# Patient Record
Sex: FEMALE | Race: WHITE | NOT HISPANIC OR LATINO | URBAN - METROPOLITAN AREA
[De-identification: names, ages, dates, MRNs, and addresses within clinical notes are randomized per-mention and may not be internally consistent; named-entity substitution may affect disease eponyms.]

---

## 2017-12-12 ENCOUNTER — APPOINTMENT (RX ONLY)
Dept: URBAN - METROPOLITAN AREA CLINIC 349 | Facility: CLINIC | Age: 37
Setting detail: DERMATOLOGY
End: 2017-12-12

## 2017-12-12 DIAGNOSIS — I78.8 OTHER DISEASES OF CAPILLARIES: ICD-10-CM

## 2017-12-12 DIAGNOSIS — D18.0 HEMANGIOMA: ICD-10-CM

## 2017-12-12 DIAGNOSIS — L81.4 OTHER MELANIN HYPERPIGMENTATION: ICD-10-CM

## 2017-12-12 PROBLEM — D18.01 HEMANGIOMA OF SKIN AND SUBCUTANEOUS TISSUE: Status: ACTIVE | Noted: 2017-12-12

## 2017-12-12 PROBLEM — F32.9 MAJOR DEPRESSIVE DISORDER, SINGLE EPISODE, UNSPECIFIED: Status: ACTIVE | Noted: 2017-12-12

## 2017-12-12 PROCEDURE — 99202 OFFICE O/P NEW SF 15 MIN: CPT | Mod: 25

## 2017-12-12 PROCEDURE — ? BENIGN DESTRUCTION

## 2017-12-12 PROCEDURE — ? COUNSELING

## 2017-12-12 PROCEDURE — 17110 DESTRUCTION B9 LES UP TO 14: CPT

## 2017-12-12 ASSESSMENT — LOCATION SIMPLE DESCRIPTION DERM
LOCATION SIMPLE: LEFT CHEEK
LOCATION SIMPLE: RIGHT CHEEK
LOCATION SIMPLE: RIGHT FOREHEAD
LOCATION SIMPLE: LEFT FOREHEAD

## 2017-12-12 ASSESSMENT — LOCATION DETAILED DESCRIPTION DERM
LOCATION DETAILED: RIGHT FOREHEAD
LOCATION DETAILED: RIGHT MEDIAL MALAR CHEEK
LOCATION DETAILED: LEFT MEDIAL MALAR CHEEK
LOCATION DETAILED: LEFT LATERAL FOREHEAD
LOCATION DETAILED: RIGHT SUPERIOR MEDIAL MALAR CHEEK
LOCATION DETAILED: LEFT MEDIAL FOREHEAD

## 2017-12-12 ASSESSMENT — LOCATION ZONE DERM: LOCATION ZONE: FACE

## 2017-12-12 NOTE — HPI: SKIN LESION
How Severe Is Your Skin Lesion?: moderate
Has Your Skin Lesion Been Treated?: not been treated
Is This A New Presentation, Or A Follow-Up?: Skin Lesion
Which Family Member (Optional)?: Father and a Grandfather

## 2017-12-12 NOTE — PROCEDURE: BENIGN DESTRUCTION
Bill Insurance (You Assume Risk Of Denial Or Audit By Selecting Yes): Yes
Medical Necessity Clause: This procedure was medically necessary because the lesions that were treated were: inflamed from day to day cleansing
Medical Necessity Information: It is in your best interest to select a reason for this procedure from the list below. All of these items fulfill various CMS LCD requirements except the new and changing color options.
Anesthesia Volume In Cc: 0
Add 52 Modifier (Optional): no
Detail Level: Detailed
Post-Care Instructions: I reviewed with the patient in detail post-care instructions. Patient is to wear sunprotection, and avoid picking at any of the treated lesions. Pt may apply Vaseline to crusted or scabbing areas. After the procedure, the patient was observed for 5-10 minutes and was oriented to,person, place and time and denied feeling dizzy, queasy and stated that they were not going to faint
Consent: The patient's consent was obtained including but not limited to risks of crusting, scabbing, blistering, scarring, darker or lighter pigmentary change, recurrence, incomplete removal and infection.

## 2022-05-10 ENCOUNTER — OFFICE VISIT (OUTPATIENT)
Dept: URBAN - NONMETROPOLITAN AREA CLINIC 2 | Facility: CLINIC | Age: 42
End: 2022-05-10
Payer: COMMERCIAL

## 2022-05-10 DIAGNOSIS — R93.5 ABNORMAL US (ULTRASOUND) OF ABDOMEN: ICD-10-CM

## 2022-05-10 DIAGNOSIS — R11.0 NAUSEA: ICD-10-CM

## 2022-05-10 DIAGNOSIS — R10.32 LLQ ABDOMINAL PAIN: ICD-10-CM

## 2022-05-10 DIAGNOSIS — R19.5 LOOSE STOOLS: ICD-10-CM

## 2022-05-10 PROCEDURE — 99204 OFFICE O/P NEW MOD 45 MIN: CPT | Performed by: NURSE PRACTITIONER

## 2022-05-10 RX ORDER — OMEPRAZOLE 40 MG/1
CAPSULE, DELAYED RELEASE ORAL
Qty: 30 | Status: ACTIVE | COMMUNITY

## 2022-05-10 RX ORDER — NORGESTIMATE AND ETHINYL ESTRADIOL 0.25-0.035
KIT ORAL
Qty: 112 | Status: ACTIVE | COMMUNITY

## 2022-05-10 RX ORDER — LAMOTRIGINE 50 MG/1
TABLET, ORALLY DISINTEGRATING ORAL
Qty: 90 | Status: ACTIVE | COMMUNITY

## 2022-05-10 RX ORDER — FLUOXETINE 40 MG/1
CAPSULE ORAL
Qty: 90 | Status: ACTIVE | COMMUNITY

## 2022-05-10 RX ORDER — GALCANEZUMAB 120 MG/ML
INJECT 1 PEN SUBCUTANEOUSLY ONCE A MONTH INJECTION, SOLUTION SUBCUTANEOUS
Qty: 1 | Refills: 1 | Status: ACTIVE | COMMUNITY

## 2022-05-10 RX ORDER — DICYCLOMINE HYDROCHLORIDE 10 MG/1
1 CAPSULES CAPSULE ORAL THREE TIMES A DAY
Qty: 90 CAPSULE | Refills: 3 | OUTPATIENT
Start: 2022-05-10 | End: 2022-09-07

## 2022-05-10 RX ORDER — SUMATRIPTAN SUCCINATE 100 MG/1
TAKE 1 TABLET BY MOUTH EVERY DAY AS NEEDED TABLET ORAL
Qty: 18 | Refills: 0 | Status: ACTIVE | COMMUNITY

## 2022-05-10 RX ORDER — VORTIOXETINE 10 MG/1
TABLET, FILM COATED ORAL
Qty: 30 | Status: ACTIVE | COMMUNITY

## 2022-05-10 NOTE — HPI-TODAY'S VISIT:
Ms. Raman is a 42-year-old female who presents with change in bowel habit and abdominal pain.  She states that earlier this year, she started experiencing loose stools.  Sometimes it would be once to few times a day.  No blood or nocturnal complaints.  She was not having any additional complaints until recently.  Recently, she started having some postprandial nausea as well as some left lower quad pain.  Left lower quad pain was somewhat dull, lasted about 3 days, and resolved.  She is no longer having abdominal pain.  PCP did order an ultrasound that was concerning for a liver hemangioma.  A CT scan has been ordered by her PCP, but she has not completed this yet.  No additional complaints. Sb

## 2022-05-13 ENCOUNTER — WEB ENCOUNTER (OUTPATIENT)
Dept: URBAN - METROPOLITAN AREA CLINIC 92 | Facility: CLINIC | Age: 42
End: 2022-05-13

## 2022-05-13 LAB
DEAMIDATED GLIADIN ABS, IGA: 3
DEAMIDATED GLIADIN ABS, IGG: 3
ENDOMYSIAL ANTIBODY IGA: NEGATIVE
H PYLORI, IGM ABS: <9
H. PYLORI, IGA ABS: <9
H. PYLORI, IGG ABS: 0.12
IMMUNOGLOBULIN A, QN, SERUM: 99
T-TRANSGLUTAMINASE (TTG) IGA: <2
T-TRANSGLUTAMINASE (TTG) IGG: <2

## 2022-05-14 ENCOUNTER — LAB OUTSIDE AN ENCOUNTER (OUTPATIENT)
Dept: URBAN - NONMETROPOLITAN AREA CLINIC 2 | Facility: CLINIC | Age: 42
End: 2022-05-14

## 2022-05-19 LAB
CALPROTECTIN, STOOL - QDX: (no result)
GASTROINTESTINAL PATHOGEN: (no result)
PANCREATICELASTASE ELISA, STOOL: (no result)

## 2022-05-20 ENCOUNTER — TELEPHONE ENCOUNTER (OUTPATIENT)
Dept: URBAN - METROPOLITAN AREA CLINIC 92 | Facility: CLINIC | Age: 42
End: 2022-05-20

## 2022-07-06 ENCOUNTER — WEB ENCOUNTER (OUTPATIENT)
Dept: URBAN - NONMETROPOLITAN AREA CLINIC 2 | Facility: CLINIC | Age: 42
End: 2022-07-06

## 2022-07-15 ENCOUNTER — OFFICE VISIT (OUTPATIENT)
Dept: URBAN - NONMETROPOLITAN AREA CLINIC 2 | Facility: CLINIC | Age: 42
End: 2022-07-15

## 2023-09-08 ENCOUNTER — P2P PATIENT RECORD (OUTPATIENT)
Age: 43
End: 2023-09-08

## 2023-09-19 ENCOUNTER — TELEPHONE ENCOUNTER (OUTPATIENT)
Dept: URBAN - NONMETROPOLITAN AREA CLINIC 2 | Facility: CLINIC | Age: 43
End: 2023-09-19

## 2023-09-22 ENCOUNTER — LAB OUTSIDE AN ENCOUNTER (OUTPATIENT)
Dept: URBAN - NONMETROPOLITAN AREA CLINIC 2 | Facility: CLINIC | Age: 43
End: 2023-09-22

## 2023-09-22 ENCOUNTER — OFFICE VISIT (OUTPATIENT)
Dept: URBAN - NONMETROPOLITAN AREA CLINIC 2 | Facility: CLINIC | Age: 43
End: 2023-09-22
Payer: COMMERCIAL

## 2023-09-22 VITALS
HEART RATE: 84 BPM | BODY MASS INDEX: 29.95 KG/M2 | WEIGHT: 169 LBS | HEIGHT: 63 IN | SYSTOLIC BLOOD PRESSURE: 147 MMHG | TEMPERATURE: 98.1 F | DIASTOLIC BLOOD PRESSURE: 100 MMHG

## 2023-09-22 DIAGNOSIS — R93.5 ABNORMAL US (ULTRASOUND) OF ABDOMEN: ICD-10-CM

## 2023-09-22 DIAGNOSIS — R11.0 NAUSEA: ICD-10-CM

## 2023-09-22 DIAGNOSIS — R10.32 LLQ ABDOMINAL PAIN: ICD-10-CM

## 2023-09-22 DIAGNOSIS — K58.0 IRRITABLE BOWEL SYNDROME WITH DIARRHEA: ICD-10-CM

## 2023-09-22 DIAGNOSIS — R19.5 LOOSE STOOLS: ICD-10-CM

## 2023-09-22 PROBLEM — 197125005: Status: ACTIVE | Noted: 2023-09-22

## 2023-09-22 PROCEDURE — 99214 OFFICE O/P EST MOD 30 MIN: CPT | Performed by: NURSE PRACTITIONER

## 2023-09-22 RX ORDER — LAMOTRIGINE 50 MG/1
TABLET, ORALLY DISINTEGRATING ORAL
Qty: 90 | Status: ACTIVE | COMMUNITY

## 2023-09-22 RX ORDER — GALCANEZUMAB 120 MG/ML
INJECT 1 PEN SUBCUTANEOUSLY ONCE A MONTH INJECTION, SOLUTION SUBCUTANEOUS
Qty: 1 | Refills: 1 | Status: ACTIVE | COMMUNITY

## 2023-09-22 RX ORDER — VORTIOXETINE 10 MG/1
TABLET, FILM COATED ORAL
Qty: 30 | Status: ACTIVE | COMMUNITY

## 2023-09-22 RX ORDER — DICYCLOMINE HYDROCHLORIDE 20 MG/1
1 TABLET TABLET ORAL THREE TIMES A DAY
Qty: 90 | Refills: 5 | OUTPATIENT
Start: 2023-09-22 | End: 2023-10-22

## 2023-09-22 RX ORDER — SUMATRIPTAN SUCCINATE 100 MG/1
TAKE 1 TABLET BY MOUTH EVERY DAY AS NEEDED TABLET ORAL
Qty: 18 | Refills: 0 | Status: ACTIVE | COMMUNITY

## 2023-09-22 RX ORDER — DIPHENOXYLATE HYDROCHLORIDE AND ATROPINE SULFATE 2.5; .025 MG/1; MG/1
1 TABLET AS NEEDED TABLET ORAL
Qty: 120 TABLET | Refills: 0 | OUTPATIENT
Start: 2023-09-22

## 2023-09-22 RX ORDER — NORGESTIMATE AND ETHINYL ESTRADIOL 0.25-0.035
KIT ORAL
Qty: 112 | Status: ACTIVE | COMMUNITY

## 2023-09-22 RX ORDER — OMEPRAZOLE 40 MG/1
CAPSULE, DELAYED RELEASE ORAL
Qty: 30 | Status: ACTIVE | COMMUNITY

## 2023-09-22 RX ORDER — FLUOXETINE 40 MG/1
CAPSULE ORAL
Qty: 90 | Status: ACTIVE | COMMUNITY

## 2023-09-22 NOTE — HPI-TODAY'S VISIT:
Autumn Raman is a 43-year-old female who presents for progressive diarrhea.  She was last seen in May with loose stools.  Blood work was normal but a fecal Jesus Pro was borderline.  A colonoscopy was recommended but Autumn elected to defer at that time.  Over the last several months she has been experiencing worsening diarrhea, now experiencing 5 or more times per day.  She saw her primary care provider who prescribed her budesonide 9 mg and cholestyramine.  She reports that with these medications she is still experiencing cramping when she goes to the bathroom but her stools have been more of a mushy pudding consistency.  She tried Imodium in the past with no improvement of symptoms.  She is now having some 2 or 3 times a day.  Sees occasional right red blood in her stool but thinks this is due to from irritation given the amount she is going to the bathroom.  Her symptoms are significantly hindering her ability to function at work on a daily basis and she would like to proceed with a colonoscopy at this time.  We discussed prescribing Lomotil for interval symptom relief.  I will also prescribe dicyclomine for the cramps she is experiencing.  Proceed with colonoscopy to rule out IBD/MC.  LG.

## 2023-09-22 NOTE — HPI-OTHER HISTORIES
5/10/22:  Ms. Raman is a 42-year-old female who presents with change in bowel habit and abdominal pain. She states that earlier this year, she started experiencing loose stools. Sometimes it would be once to few times a day. No blood or nocturnal complaints. She was not having any additional complaints until recently. Recently, she started having some postprandial nausea as well as some left lower quad pain. Left lower quad pain was somewhat dull, lasted about 3 days, and resolved. She is no longer having abdominal pain. PCP did order an ultrasound that was concerning for a liver hemangioma. A CT scan has been ordered by her PCP, but she has not completed this yet. No additional complaints. Sb

## 2023-10-23 ENCOUNTER — WEB ENCOUNTER (OUTPATIENT)
Dept: URBAN - NONMETROPOLITAN AREA CLINIC 2 | Facility: CLINIC | Age: 43
End: 2023-10-23

## 2023-11-14 ENCOUNTER — OFFICE VISIT (OUTPATIENT)
Dept: URBAN - NONMETROPOLITAN AREA SURGERY CENTER 1 | Facility: SURGERY CENTER | Age: 43
End: 2023-11-14
Payer: COMMERCIAL

## 2023-11-14 ENCOUNTER — CLAIMS CREATED FROM THE CLAIM WINDOW (OUTPATIENT)
Dept: URBAN - METROPOLITAN AREA CLINIC 4 | Facility: CLINIC | Age: 43
End: 2023-11-14
Payer: COMMERCIAL

## 2023-11-14 DIAGNOSIS — K52.832 LYMPHOCYTIC COLITIS: ICD-10-CM

## 2023-11-14 DIAGNOSIS — R19.7 DIARRHEA, UNSPECIFIED TYPE: ICD-10-CM

## 2023-11-14 DIAGNOSIS — K52.832 LYMPHOCYTIC  COLITIS: ICD-10-CM

## 2023-11-14 DIAGNOSIS — R19.7 ACUTE DIARRHEA: ICD-10-CM

## 2023-11-14 DIAGNOSIS — K62.89 OTHER SPECIFIED DISEASES OF ANUS AND RECTUM: ICD-10-CM

## 2023-11-14 DIAGNOSIS — K52.832 COLITIS, UNSPEC (558.9): ICD-10-CM

## 2023-11-14 DIAGNOSIS — K52.839 MICROSCOPIC COLITIS, UNSPECIFIED: ICD-10-CM

## 2023-11-14 PROCEDURE — 88342 IMHCHEM/IMCYTCHM 1ST ANTB: CPT | Performed by: PATHOLOGY

## 2023-11-14 PROCEDURE — 88305 TISSUE EXAM BY PATHOLOGIST: CPT | Performed by: PATHOLOGY

## 2023-11-14 PROCEDURE — 00811 ANES LWR INTST NDSC NOS: CPT | Performed by: NURSE ANESTHETIST, CERTIFIED REGISTERED

## 2023-11-14 PROCEDURE — 45380 COLONOSCOPY AND BIOPSY: CPT | Performed by: INTERNAL MEDICINE

## 2023-11-14 PROCEDURE — 88313 SPECIAL STAINS GROUP 2: CPT | Performed by: PATHOLOGY

## 2023-11-14 PROCEDURE — G8907 PT DOC NO EVENTS ON DISCHARG: HCPCS | Performed by: INTERNAL MEDICINE

## 2023-12-20 ENCOUNTER — OFFICE VISIT (OUTPATIENT)
Dept: URBAN - NONMETROPOLITAN AREA CLINIC 2 | Facility: CLINIC | Age: 43
End: 2023-12-20
Payer: COMMERCIAL

## 2023-12-20 VITALS
WEIGHT: 160 LBS | SYSTOLIC BLOOD PRESSURE: 134 MMHG | HEIGHT: 63 IN | BODY MASS INDEX: 28.35 KG/M2 | HEART RATE: 68 BPM | DIASTOLIC BLOOD PRESSURE: 84 MMHG

## 2023-12-20 DIAGNOSIS — R10.32 LLQ ABDOMINAL PAIN: ICD-10-CM

## 2023-12-20 DIAGNOSIS — R93.5 ABNORMAL US (ULTRASOUND) OF ABDOMEN: ICD-10-CM

## 2023-12-20 DIAGNOSIS — R19.5 LOOSE STOOLS: ICD-10-CM

## 2023-12-20 DIAGNOSIS — K52.832 LYMPHOCYTIC COLITIS: ICD-10-CM

## 2023-12-20 DIAGNOSIS — R11.0 NAUSEA: ICD-10-CM

## 2023-12-20 DIAGNOSIS — K58.0 IRRITABLE BOWEL SYNDROME WITH DIARRHEA: ICD-10-CM

## 2023-12-20 PROBLEM — 1187071008: Status: ACTIVE | Noted: 2023-12-20

## 2023-12-20 PROCEDURE — 99214 OFFICE O/P EST MOD 30 MIN: CPT | Performed by: NURSE PRACTITIONER

## 2023-12-20 RX ORDER — NORGESTIMATE AND ETHINYL ESTRADIOL 0.25-0.035
KIT ORAL
Qty: 112 | Status: ON HOLD | COMMUNITY

## 2023-12-20 RX ORDER — VORTIOXETINE 10 MG/1
TABLET, FILM COATED ORAL
Qty: 30 | Status: ON HOLD | COMMUNITY

## 2023-12-20 RX ORDER — DICYCLOMINE HYDROCHLORIDE 20 MG/1
TAKE 1 TABLET BY MOUTH THREE TIMES DAILY TABLET ORAL
Qty: 90 EACH | Refills: 0 | Status: ACTIVE | COMMUNITY

## 2023-12-20 RX ORDER — LAMOTRIGINE 50 MG/1
TABLET, ORALLY DISINTEGRATING ORAL
Qty: 90 | Status: ON HOLD | COMMUNITY

## 2023-12-20 RX ORDER — BUDESONIDE 9 MG/1
1 TABLET IN THE MORNING TABLET, FILM COATED, EXTENDED RELEASE ORAL ONCE A DAY
Qty: 84 TABLET | Refills: 0 | OUTPATIENT
Start: 2023-12-20 | End: 2024-03-13

## 2023-12-20 RX ORDER — DIPHENOXYLATE HYDROCHLORIDE AND ATROPINE SULFATE 2.5; .025 MG/1; MG/1
1 TABLET AS NEEDED TABLET ORAL
Qty: 120 TABLET | Refills: 0 | Status: ACTIVE | COMMUNITY
Start: 2023-09-22

## 2023-12-20 RX ORDER — DIPHENOXYLATE HYDROCHLORIDE AND ATROPINE SULFATE 2.5; .025 MG/1; MG/1
TABLET ORAL
Qty: 120 TABLET | Status: ACTIVE | COMMUNITY

## 2023-12-20 RX ORDER — FLUOXETINE 40 MG/1
CAPSULE ORAL
Qty: 90 | Status: ACTIVE | COMMUNITY

## 2023-12-20 RX ORDER — GALCANEZUMAB 120 MG/ML
INJECT 1 PEN SUBCUTANEOUSLY ONCE A MONTH INJECTION, SOLUTION SUBCUTANEOUS
Qty: 1 | Refills: 1 | Status: ACTIVE | COMMUNITY

## 2023-12-20 RX ORDER — DIPHENOXYLATE HYDROCHLORIDE AND ATROPINE SULFATE 2.5; .025 MG/1; MG/1
1 TABLET AS NEEDED TABLET ORAL
Qty: 120 TABLET | Refills: 0 | OUTPATIENT

## 2023-12-20 RX ORDER — GALCANEZUMAB 120 MG/ML
INJECTION, SOLUTION SUBCUTANEOUS
Qty: 3 MILLILITER | Status: ACTIVE | COMMUNITY

## 2023-12-20 RX ORDER — SUMATRIPTAN SUCCINATE 100 MG/1
TAKE 1 TABLET BY MOUTH EVERY DAY AS NEEDED TABLET ORAL
Qty: 18 | Refills: 0 | Status: ACTIVE | COMMUNITY

## 2023-12-20 RX ORDER — OMEPRAZOLE 40 MG/1
CAPSULE, DELAYED RELEASE ORAL
Qty: 30 | Status: ON HOLD | COMMUNITY

## 2023-12-20 RX ORDER — LIOTHYRONINE SODIUM 25 UG/1
TAKE 1 TABLET BY MOUTH EVERY DAY AS DIRECTED TABLET ORAL
Qty: 30 EACH | Refills: 1 | Status: ACTIVE | COMMUNITY

## 2023-12-20 NOTE — HPI-OTHER HISTORIES
5/10/22:  Ms. Raman is a 42-year-old female who presents with change in bowel habit and abdominal pain. She states that earlier this year, she started experiencing loose stools. Sometimes it would be once to few times a day. No blood or nocturnal complaints. She was not having any additional complaints until recently. Recently, she started having some postprandial nausea as well as some left lower quad pain. Left lower quad pain was somewhat dull, lasted about 3 days, and resolved. She is no longer having abdominal pain. PCP did order an ultrasound that was concerning for a liver hemangioma. A CT scan has been ordered by her PCP, but she has not completed this yet. No additional complaints. Sb  9/2023: Autumn Raman is a 43-year-old female who presents for progressive diarrhea. She was last seen in May with loose stools. Blood work was normal but a fecal Jesus Pro was borderline. A colonoscopy was recommended but Autumn elected to defer at that time. Over the last several months she has been experiencing worsening diarrhea, now experiencing 5 or more times per day. She saw her primary care provider who prescribed her budesonide 9 mg and cholestyramine. She reports that with these medications she is still experiencing cramping when she goes to the bathroom but her stools have been more of a mushy pudding consistency. She tried Imodium in the past with no improvement of symptoms. She is now having some 2 or 3 times a day. Sees occasional right red blood in her stool but thinks this is due to from irritation given the amount she is going to the bathroom. Her symptoms are significantly hindering her ability to function at work on a daily basis and she would like to proceed with a colonoscopy at this time. We discussed prescribing Lomotil for interval symptom relief. I will also prescribe dicyclomine for the cramps she is experiencing. Proceed with colonoscopy to rule out IBD/MC. LG.

## 2023-12-20 NOTE — HPI-TODAY'S VISIT:
11/14/2023 - colon - erosion in distal rectum, path consistent with lymphocytic colitis  12/20/2023: Ms. Autumn escobar is a 43-year-old female who presents today for follow-up of colonoscopy.  Since her last visit colonoscopy showed an erosion in the distal rectum with path consistent with lymphocytic colitis.  It was otherwise normal.  She is currently been taking Lomotil, Bentyl, and budesonide 9 mg all daily with pudding-like stools usually once a day.  She also endorses occasional cramping and takes additional Bentyl.  No blood in her stool.  No nocturnal complaints.  LG.

## 2023-12-21 ENCOUNTER — TELEPHONE ENCOUNTER (OUTPATIENT)
Dept: URBAN - NONMETROPOLITAN AREA CLINIC 13 | Facility: CLINIC | Age: 43
End: 2023-12-21

## 2024-01-12 ENCOUNTER — WEB ENCOUNTER (OUTPATIENT)
Dept: URBAN - NONMETROPOLITAN AREA CLINIC 2 | Facility: CLINIC | Age: 44
End: 2024-01-12

## 2024-01-12 ENCOUNTER — TELEPHONE ENCOUNTER (OUTPATIENT)
Dept: URBAN - NONMETROPOLITAN AREA CLINIC 13 | Facility: CLINIC | Age: 44
End: 2024-01-12

## 2024-01-12 RX ORDER — BUDESONIDE 9 MG/1
1 TABLET IN THE MORNING TABLET, FILM COATED, EXTENDED RELEASE ORAL ONCE A DAY
Qty: 84 TABLET | Refills: 0
Start: 2023-12-20 | End: 2024-04-05

## 2024-01-30 ENCOUNTER — TELEPHONE ENCOUNTER (OUTPATIENT)
Dept: URBAN - NONMETROPOLITAN AREA CLINIC 2 | Facility: CLINIC | Age: 44
End: 2024-01-30

## 2024-01-30 RX ORDER — BUDESONIDE 9 MG/1
1 TABLET IN THE MORNING TABLET, FILM COATED, EXTENDED RELEASE ORAL ONCE A DAY
Qty: 84 TABLET | Refills: 0
Start: 2023-12-20 | End: 2024-04-23

## 2024-03-20 ENCOUNTER — OV EP (OUTPATIENT)
Dept: URBAN - NONMETROPOLITAN AREA CLINIC 2 | Facility: CLINIC | Age: 44
End: 2024-03-20
Payer: COMMERCIAL

## 2024-03-20 VITALS
SYSTOLIC BLOOD PRESSURE: 122 MMHG | WEIGHT: 158.2 LBS | BODY MASS INDEX: 28.03 KG/M2 | HEIGHT: 63 IN | HEART RATE: 83 BPM | DIASTOLIC BLOOD PRESSURE: 69 MMHG

## 2024-03-20 DIAGNOSIS — K58.0 IRRITABLE BOWEL SYNDROME WITH DIARRHEA: ICD-10-CM

## 2024-03-20 DIAGNOSIS — K52.832 LYMPHOCYTIC COLITIS: ICD-10-CM

## 2024-03-20 PROCEDURE — 99214 OFFICE O/P EST MOD 30 MIN: CPT | Performed by: INTERNAL MEDICINE

## 2024-03-20 RX ORDER — GALCANEZUMAB 120 MG/ML
INJECT 1 PEN SUBCUTANEOUSLY ONCE A MONTH INJECTION, SOLUTION SUBCUTANEOUS
Qty: 1 | Refills: 1 | Status: ACTIVE | COMMUNITY

## 2024-03-20 RX ORDER — BUDESONIDE 9 MG/1
1 TABLET IN THE MORNING TABLET, FILM COATED, EXTENDED RELEASE ORAL ONCE A DAY
Qty: 84 TABLET | Refills: 0 | Status: ON HOLD | COMMUNITY
Start: 2023-12-20 | End: 2024-04-29

## 2024-03-20 RX ORDER — BUDESONIDE 9 MG/1
1 TABLET IN THE MORNING TABLET, FILM COATED, EXTENDED RELEASE ORAL ONCE A DAY
Qty: 84 TABLET | Refills: 3

## 2024-03-20 RX ORDER — DIPHENOXYLATE HYDROCHLORIDE AND ATROPINE SULFATE 2.5; .025 MG/1; MG/1
1 TABLET AS NEEDED TABLET ORAL
Qty: 120 TABLET | Refills: 0 | Status: ON HOLD | COMMUNITY

## 2024-03-20 RX ORDER — LAMOTRIGINE 50 MG/1
TABLET, ORALLY DISINTEGRATING ORAL
Qty: 90 | Status: ON HOLD | COMMUNITY

## 2024-03-20 RX ORDER — SUMATRIPTAN SUCCINATE 100 MG/1
TAKE 1 TABLET BY MOUTH EVERY DAY AS NEEDED TABLET ORAL
Qty: 18 | Refills: 0 | Status: ACTIVE | COMMUNITY

## 2024-03-20 RX ORDER — DIPHENOXYLATE HYDROCHLORIDE AND ATROPINE SULFATE 2.5; .025 MG/1; MG/1
TABLET ORAL
Qty: 120 TABLET | Status: ACTIVE | COMMUNITY

## 2024-03-20 RX ORDER — DICYCLOMINE HYDROCHLORIDE 20 MG/1
TAKE 1 TABLET BY MOUTH THREE TIMES DAILY TABLET ORAL
Qty: 90 EACH | Refills: 0 | Status: ACTIVE | COMMUNITY

## 2024-03-20 RX ORDER — LIOTHYRONINE SODIUM 25 UG/1
TAKE 1 TABLET BY MOUTH EVERY DAY AS DIRECTED TABLET ORAL
Qty: 30 EACH | Refills: 1 | Status: ACTIVE | COMMUNITY

## 2024-03-20 RX ORDER — RIFAXIMIN 550 MG/1
1 TABLET TABLET ORAL THREE TIMES A DAY
Qty: 42 TABLET | Refills: 2 | OUTPATIENT
Start: 2024-03-20 | End: 2024-05-01

## 2024-03-20 RX ORDER — FLUOXETINE 40 MG/1
CAPSULE ORAL
Qty: 90 | Status: ACTIVE | COMMUNITY

## 2024-03-20 RX ORDER — GALCANEZUMAB 120 MG/ML
INJECTION, SOLUTION SUBCUTANEOUS
Qty: 3 MILLILITER | Status: ACTIVE | COMMUNITY

## 2024-03-20 RX ORDER — VORTIOXETINE 10 MG/1
TABLET, FILM COATED ORAL
Qty: 30 | Status: ON HOLD | COMMUNITY

## 2024-03-20 RX ORDER — OMEPRAZOLE 40 MG/1
CAPSULE, DELAYED RELEASE ORAL
Qty: 30 | Status: ON HOLD | COMMUNITY

## 2024-03-20 RX ORDER — NORGESTIMATE AND ETHINYL ESTRADIOL 0.25-0.035
KIT ORAL
Qty: 112 | Status: ON HOLD | COMMUNITY

## 2024-03-20 NOTE — HPI-TODAY'S VISIT:
11/14/2023 - colon - erosion in distal rectum normal but random biopsies of right colon with lymphocytic colitis  12/20/2023: Ms. Autumn escobar is a 43-year-old female who presents today for follow-up of colonoscopy.  Since her last visit colonoscopy showed an erosion in the distal rectum with path consistent with lymphocytic colitis.  It was otherwise normal.  She is currently been taking Lomotil, Bentyl, and budesonide 9 mg all daily with pudding-like stools usually once a day.  She also endorses occasional cramping and takes additional Bentyl.  No blood in her stool.  No nocturnal complaints.  LG.  3.20.24: Ms. Escobar returns for follow-up of lymphocytic colitis.  At her last clinic visit, she continued budesoide as well as Lomotil and dicyclomine.  Today she reports that

## 2024-06-25 ENCOUNTER — DASHBOARD ENCOUNTERS (OUTPATIENT)
Age: 44
End: 2024-06-25

## 2024-06-26 ENCOUNTER — OFFICE VISIT (OUTPATIENT)
Dept: URBAN - NONMETROPOLITAN AREA CLINIC 2 | Facility: CLINIC | Age: 44
End: 2024-06-26
Payer: COMMERCIAL

## 2024-06-26 VITALS
BODY MASS INDEX: 28.35 KG/M2 | SYSTOLIC BLOOD PRESSURE: 121 MMHG | HEART RATE: 73 BPM | HEIGHT: 63 IN | WEIGHT: 160 LBS | DIASTOLIC BLOOD PRESSURE: 84 MMHG

## 2024-06-26 DIAGNOSIS — K52.832 LYMPHOCYTIC COLITIS: ICD-10-CM

## 2024-06-26 DIAGNOSIS — K58.0 IRRITABLE BOWEL SYNDROME WITH DIARRHEA: ICD-10-CM

## 2024-06-26 PROCEDURE — 99214 OFFICE O/P EST MOD 30 MIN: CPT | Performed by: INTERNAL MEDICINE

## 2024-06-26 RX ORDER — DIPHENOXYLATE HYDROCHLORIDE AND ATROPINE SULFATE 2.5; .025 MG/1; MG/1
1 TABLET AS NEEDED TABLET ORAL
Qty: 120 TABLET | Refills: 0 | Status: ACTIVE | COMMUNITY

## 2024-06-26 RX ORDER — LAMOTRIGINE 50 MG/1
TABLET, ORALLY DISINTEGRATING ORAL
Qty: 90 | Status: ACTIVE | COMMUNITY

## 2024-06-26 RX ORDER — BUDESONIDE 3 MG/1
3 CAPSULES CAPSULE ORAL ONCE A DAY
Qty: 180 CAPSULE | Refills: 1 | OUTPATIENT
Start: 2024-06-26

## 2024-06-26 RX ORDER — FLUOXETINE 40 MG/1
CAPSULE ORAL
Qty: 90 | Status: ACTIVE | COMMUNITY

## 2024-06-26 RX ORDER — LIOTHYRONINE SODIUM 25 UG/1
TAKE 1 TABLET BY MOUTH EVERY DAY AS DIRECTED TABLET ORAL
Qty: 30 EACH | Refills: 1 | Status: ACTIVE | COMMUNITY

## 2024-06-26 RX ORDER — NORGESTIMATE AND ETHINYL ESTRADIOL 0.25-0.035
KIT ORAL
Qty: 112 | Status: ACTIVE | COMMUNITY

## 2024-06-26 RX ORDER — DIPHENOXYLATE HYDROCHLORIDE AND ATROPINE SULFATE 2.5; .025 MG/1; MG/1
TABLET ORAL
Qty: 120 TABLET | Status: ACTIVE | COMMUNITY

## 2024-06-26 RX ORDER — GALCANEZUMAB 120 MG/ML
INJECT 1 PEN SUBCUTANEOUSLY ONCE A MONTH INJECTION, SOLUTION SUBCUTANEOUS
Qty: 1 | Refills: 1 | Status: ACTIVE | COMMUNITY

## 2024-06-26 RX ORDER — OMEPRAZOLE 40 MG/1
CAPSULE, DELAYED RELEASE ORAL
Qty: 30 | Status: ACTIVE | COMMUNITY

## 2024-06-26 RX ORDER — BUDESONIDE 9 MG/1
1 TABLET IN THE MORNING TABLET, FILM COATED, EXTENDED RELEASE ORAL ONCE A DAY
Qty: 84 TABLET | Refills: 3 | Status: ACTIVE | COMMUNITY

## 2024-06-26 RX ORDER — GALCANEZUMAB 120 MG/ML
INJECTION, SOLUTION SUBCUTANEOUS
Qty: 3 MILLILITER | Status: ACTIVE | COMMUNITY

## 2024-06-26 RX ORDER — VORTIOXETINE 10 MG/1
TABLET, FILM COATED ORAL
Qty: 30 | Status: ACTIVE | COMMUNITY

## 2024-06-26 RX ORDER — SUMATRIPTAN SUCCINATE 100 MG/1
TAKE 1 TABLET BY MOUTH EVERY DAY AS NEEDED TABLET ORAL
Qty: 18 | Refills: 0 | Status: ACTIVE | COMMUNITY

## 2024-06-26 RX ORDER — DICYCLOMINE HYDROCHLORIDE 10 MG/1
1 CAPSULE 30 MINUTES BEFORE EATING CAPSULE ORAL THREE TIMES A DAY
Qty: 180 | Refills: 3 | OUTPATIENT
Start: 2024-06-26 | End: 2025-06-21

## 2024-06-26 RX ORDER — DICYCLOMINE HYDROCHLORIDE 20 MG/1
TAKE 1 TABLET BY MOUTH THREE TIMES DAILY TABLET ORAL
Qty: 90 EACH | Refills: 0 | Status: ACTIVE | COMMUNITY

## 2024-06-26 NOTE — HPI-TODAY'S VISIT:
11/14/2023 - colon - erosion in distal rectum normal but random biopsies of right colon with lymphocytic colitis  12/20/2023: Ms. Autumn escobar is a 43-year-old female who presents today for follow-up of colonoscopy.  Since her last visit colonoscopy showed an erosion in the distal rectum with path consistent with lymphocytic colitis.  It was otherwise normal.  She is currently been taking Lomotil, Bentyl, and budesonide 9 mg all daily with pudding-like stools usually once a day.  She also endorses occasional cramping and takes additional Bentyl.  No blood in her stool.  No nocturnal complaints.  LG.  6/26/24: Ms. Escobar returns for follow-up of lymphocytic colitis.  At her last clinic visit, she continued budesoide as well as Lomotil and dicyclomine.  Today she reports that she has stopped her budesonide as well as Lomotil and dicyclomine.  She is having pudding consistency stools after meals.  Sometimes she will awaken from sleep around 3 AM to have a BM.  Weight is stable.  No blood noted.

## 2024-08-14 ENCOUNTER — OFFICE VISIT (OUTPATIENT)
Dept: URBAN - NONMETROPOLITAN AREA CLINIC 2 | Facility: CLINIC | Age: 44
End: 2024-08-14
Payer: COMMERCIAL

## 2024-08-14 ENCOUNTER — LAB OUTSIDE AN ENCOUNTER (OUTPATIENT)
Dept: URBAN - NONMETROPOLITAN AREA CLINIC 2 | Facility: CLINIC | Age: 44
End: 2024-08-14

## 2024-08-14 VITALS
BODY MASS INDEX: 28.81 KG/M2 | SYSTOLIC BLOOD PRESSURE: 108 MMHG | HEIGHT: 63 IN | WEIGHT: 162.6 LBS | HEART RATE: 80 BPM | DIASTOLIC BLOOD PRESSURE: 78 MMHG

## 2024-08-14 DIAGNOSIS — K52.832 LYMPHOCYTIC COLITIS: ICD-10-CM

## 2024-08-14 DIAGNOSIS — K58.0 IRRITABLE BOWEL SYNDROME WITH DIARRHEA: ICD-10-CM

## 2024-08-14 PROCEDURE — 99214 OFFICE O/P EST MOD 30 MIN: CPT | Performed by: NURSE PRACTITIONER

## 2024-08-14 RX ORDER — GALCANEZUMAB 120 MG/ML
INJECT 1 PEN SUBCUTANEOUSLY ONCE A MONTH INJECTION, SOLUTION SUBCUTANEOUS
Qty: 1 | Refills: 1 | Status: DISCONTINUED | COMMUNITY

## 2024-08-14 RX ORDER — UBROGEPANT 100 MG/1
1 TABLET MAY TAKE SECOND DOSE AT LEAST 2 HOURS AFTER FIRST DOSE AS NEEDED TABLET ORAL ONCE A DAY
Status: ACTIVE | COMMUNITY

## 2024-08-14 RX ORDER — LIOTHYRONINE SODIUM 25 UG/1
TAKE 1 TABLET BY MOUTH EVERY DAY AS DIRECTED TABLET ORAL
Qty: 30 EACH | Refills: 1 | Status: ACTIVE | COMMUNITY

## 2024-08-14 RX ORDER — GALCANEZUMAB 120 MG/ML
INJECTION, SOLUTION SUBCUTANEOUS
Qty: 3 MILLILITER | Status: ACTIVE | COMMUNITY

## 2024-08-14 RX ORDER — DIPHENOXYLATE HYDROCHLORIDE AND ATROPINE SULFATE 2.5; .025 MG/1; MG/1
TABLET ORAL
Qty: 120 TABLET | Status: ACTIVE | COMMUNITY

## 2024-08-14 RX ORDER — NORGESTIMATE AND ETHINYL ESTRADIOL 0.25-0.035
KIT ORAL
Qty: 112 | Status: ACTIVE | COMMUNITY

## 2024-08-14 RX ORDER — BUDESONIDE 3 MG/1
1 CAPSULE CAPSULE ORAL ONCE A DAY
Qty: 90 CAPSULE | Refills: 3 | OUTPATIENT

## 2024-08-14 RX ORDER — ATOGEPANT 60 MG/1
1 TABLET TABLET ORAL ONCE A DAY
Qty: 30 | Status: ACTIVE | COMMUNITY
Start: 2024-08-14 | End: 2024-09-13

## 2024-08-14 RX ORDER — BUDESONIDE 9 MG/1
1 TABLET IN THE MORNING TABLET, FILM COATED, EXTENDED RELEASE ORAL ONCE A DAY
Qty: 84 TABLET | Refills: 3 | Status: ACTIVE | COMMUNITY

## 2024-08-14 RX ORDER — LAMOTRIGINE 50 MG/1
TABLET, ORALLY DISINTEGRATING ORAL
Qty: 90 | Status: ACTIVE | COMMUNITY

## 2024-08-14 RX ORDER — VORTIOXETINE 10 MG/1
TABLET, FILM COATED ORAL
Qty: 30 | Status: ACTIVE | COMMUNITY

## 2024-08-14 RX ORDER — FLUOXETINE 40 MG/1
CAPSULE ORAL
Qty: 90 | Status: ACTIVE | COMMUNITY

## 2024-08-14 RX ORDER — OMEPRAZOLE 40 MG/1
CAPSULE, DELAYED RELEASE ORAL
Qty: 30 | Status: ACTIVE | COMMUNITY

## 2024-08-14 RX ORDER — BUDESONIDE 3 MG/1
3 CAPSULES CAPSULE ORAL ONCE A DAY
Qty: 180 CAPSULE | Refills: 1 | Status: ACTIVE | COMMUNITY
Start: 2024-06-26

## 2024-08-14 RX ORDER — DICYCLOMINE HYDROCHLORIDE 10 MG/1
1 CAPSULE 30 MINUTES BEFORE EATING CAPSULE ORAL THREE TIMES A DAY
Qty: 180 | Refills: 3 | Status: ACTIVE | COMMUNITY
Start: 2024-06-26 | End: 2025-06-21

## 2024-08-14 RX ORDER — SUMATRIPTAN SUCCINATE 100 MG/1
TAKE 1 TABLET BY MOUTH EVERY DAY AS NEEDED TABLET ORAL
Qty: 18 | Refills: 0 | Status: DISCONTINUED | COMMUNITY

## 2024-08-14 RX ORDER — DICYCLOMINE HYDROCHLORIDE 20 MG/1
TAKE 1 TABLET BY MOUTH THREE TIMES DAILY TABLET ORAL
Qty: 90 EACH | Refills: 0 | Status: ACTIVE | COMMUNITY

## 2024-08-14 RX ORDER — DIPHENOXYLATE HYDROCHLORIDE AND ATROPINE SULFATE 2.5; .025 MG/1; MG/1
1 TABLET AS NEEDED TABLET ORAL
Qty: 120 TABLET | Refills: 0 | Status: ACTIVE | COMMUNITY

## 2024-08-14 NOTE — HPI-OTHER HISTORIES
5/10/22:  Ms. Escobar is a 42-year-old female who presents with change in bowel habit and abdominal pain. She states that earlier this year, she started experiencing loose stools. Sometimes it would be once to few times a day. No blood or nocturnal complaints. She was not having any additional complaints until recently. Recently, she started having some postprandial nausea as well as some left lower quad pain. Left lower quad pain was somewhat dull, lasted about 3 days, and resolved. She is no longer having abdominal pain. PCP did order an ultrasound that was concerning for a liver hemangioma. A CT scan has been ordered by her PCP, but she has not completed this yet. No additional complaints. Sb  9/2023: Autumn Escobar is a 43-year-old female who presents for progressive diarrhea. She was last seen in May with loose stools. Blood work was normal but a fecal Jesus Pro was borderline. A colonoscopy was recommended but Autumn elected to defer at that time. Over the last several months she has been experiencing worsening diarrhea, now experiencing 5 or more times per day. She saw her primary care provider who prescribed her budesonide 9 mg and cholestyramine. She reports that with these medications she is still experiencing cramping when she goes to the bathroom but her stools have been more of a mushy pudding consistency. She tried Imodium in the past with no improvement of symptoms. She is now having some 2 or 3 times a day. Sees occasional right red blood in her stool but thinks this is due to from irritation given the amount she is going to the bathroom. Her symptoms are significantly hindering her ability to function at work on a daily basis and she would like to proceed with a colonoscopy at this time. We discussed prescribing Lomotil for interval symptom relief. I will also prescribe dicyclomine for the cramps she is experiencing. Proceed with colonoscopy to rule out IBD/MC. LG.  11/14/2023 - colon - erosion in distal rectum normal but random biopsies of right colon with lymphocytic colitis  12/20/2023: Ms. Autumn escobar is a 43-year-old female who presents today for follow-up of colonoscopy. Since her last visit colonoscopy showed an erosion in the distal rectum with path consistent with lymphocytic colitis. It was otherwise normal. She is currently been taking Lomotil, Bentyl, and budesonide 9 mg all daily with pudding-like stools usually once a day. She also endorses occasional cramping and takes additional Bentyl. No blood in her stool. No nocturnal complaints. LG.   6/26/24: Ms. Escobar returns for follow-up of lymphocytic colitis. At her last clinic visit, she continued budesoide as well as Lomotil and dicyclomine. Today she reports that she has stopped her budesonide as well as Lomotil and dicyclomine. She is having pudding consistency stools after meals. Sometimes she will awaken from sleep around 3 AM to have a BM. Weight is stable. No blood noted.

## 2024-08-14 NOTE — HPI-TODAY'S VISIT:
Ms. Autumn Raman is a 44-year-old female who presents today for follow-up of lymphocytic colitis and IBS-D.  Since her last visit she resumed budesonide and was able to successfully weaned to 3 mg daily.  When she stops budesonide she has breakthrough diarrhea.  She is not requiring dicyclomine before meals and denies any abdominal pain.  Denies any indigestion, nausea vomiting, melena, hematemesis, or upper abdominal pain and would like to defer EGD at this point.  Understands indication to proceed with colonoscopy and will do so before the end of the year once deductible is met.  LG.

## 2024-12-03 ENCOUNTER — OFFICE VISIT (OUTPATIENT)
Dept: URBAN - NONMETROPOLITAN AREA SURGERY CENTER 1 | Facility: SURGERY CENTER | Age: 44
End: 2024-12-03

## 2025-01-10 ENCOUNTER — OFFICE VISIT (OUTPATIENT)
Dept: URBAN - NONMETROPOLITAN AREA CLINIC 2 | Facility: CLINIC | Age: 45
End: 2025-01-10
Payer: COMMERCIAL

## 2025-01-10 VITALS — HEIGHT: 63 IN

## 2025-01-10 DIAGNOSIS — K52.832 LYMPHOCYTIC COLITIS: ICD-10-CM

## 2025-01-10 PROCEDURE — 99213 OFFICE O/P EST LOW 20 MIN: CPT | Performed by: INTERNAL MEDICINE

## 2025-01-10 RX ORDER — UBROGEPANT 100 MG/1
1 TABLET MAY TAKE SECOND DOSE AT LEAST 2 HOURS AFTER FIRST DOSE AS NEEDED TABLET ORAL ONCE A DAY
Status: ACTIVE | COMMUNITY

## 2025-01-10 RX ORDER — BUDESONIDE 9 MG/1
1 TABLET IN THE MORNING TABLET, FILM COATED, EXTENDED RELEASE ORAL ONCE A DAY
Qty: 84 TABLET | Refills: 3 | Status: ACTIVE | COMMUNITY

## 2025-01-10 RX ORDER — LIOTHYRONINE SODIUM 25 UG/1
TAKE 1 TABLET BY MOUTH EVERY DAY AS DIRECTED TABLET ORAL
Qty: 30 EACH | Refills: 1 | Status: ACTIVE | COMMUNITY

## 2025-01-10 RX ORDER — DIPHENOXYLATE HYDROCHLORIDE AND ATROPINE SULFATE 2.5; .025 MG/1; MG/1
1 TABLET AS NEEDED TABLET ORAL
Qty: 120 TABLET | Refills: 0 | Status: ACTIVE | COMMUNITY

## 2025-01-10 RX ORDER — DICYCLOMINE HYDROCHLORIDE 20 MG/1
TAKE 1 TABLET BY MOUTH THREE TIMES DAILY TABLET ORAL
Qty: 90 EACH | Refills: 0 | Status: ACTIVE | COMMUNITY

## 2025-01-10 RX ORDER — VORTIOXETINE 10 MG/1
TABLET, FILM COATED ORAL
Qty: 30 | Status: ACTIVE | COMMUNITY

## 2025-01-10 RX ORDER — DIPHENOXYLATE HYDROCHLORIDE AND ATROPINE SULFATE 2.5; .025 MG/1; MG/1
TABLET ORAL
Qty: 120 TABLET | Status: ACTIVE | COMMUNITY

## 2025-01-10 RX ORDER — OMEPRAZOLE 40 MG/1
CAPSULE, DELAYED RELEASE ORAL
Qty: 30 | Status: ACTIVE | COMMUNITY

## 2025-01-10 RX ORDER — DICYCLOMINE HYDROCHLORIDE 10 MG/1
1 CAPSULE 30 MINUTES BEFORE EATING CAPSULE ORAL THREE TIMES A DAY
Qty: 180 | Refills: 3 | Status: ACTIVE | COMMUNITY
Start: 2024-06-26 | End: 2025-06-21

## 2025-01-10 RX ORDER — FLUOXETINE 40 MG/1
CAPSULE ORAL
Qty: 90 | Status: ACTIVE | COMMUNITY

## 2025-01-10 RX ORDER — BUDESONIDE 3 MG/1
1 CAPSULE CAPSULE ORAL ONCE A DAY
Qty: 90 CAPSULE | Refills: 3 | Status: ACTIVE | COMMUNITY

## 2025-01-10 RX ORDER — BUDESONIDE 3 MG/1
1 CAPSULE CAPSULE ORAL ONCE A DAY
Qty: 90 CAPSULE | Refills: 3 | OUTPATIENT

## 2025-01-10 RX ORDER — GALCANEZUMAB 120 MG/ML
INJECTION, SOLUTION SUBCUTANEOUS
Qty: 3 MILLILITER | Status: ACTIVE | COMMUNITY

## 2025-01-10 RX ORDER — NORGESTIMATE AND ETHINYL ESTRADIOL 0.25-0.035
KIT ORAL
Qty: 112 | Status: ACTIVE | COMMUNITY

## 2025-01-10 RX ORDER — LAMOTRIGINE 50 MG/1
TABLET, ORALLY DISINTEGRATING ORAL
Qty: 90 | Status: ACTIVE | COMMUNITY

## 2025-01-10 NOTE — HPI-TODAY'S VISIT:
8/14/24: Ms. Autumn Raman is a 44-year-old female who presents today via telephone call for follow-up of lymphocytic colitis.  Telephone was used in lieu of telehealth due to technical difficulties.  She remains on budesonide 3 mg daily.  When she stops the medication she develops chronic diarrhea.  She is very happy on budesonide right now.  She is not having side effects and no difficulties obtaining the medication.  She had a repeat colonoscopy scheduled it but had to cancel.

## 2025-01-10 NOTE — HPI-OTHER HISTORIES
5/10/22:  Ms. Escobar is a 42-year-old female who presents with change in bowel habit and abdominal pain. She states that earlier this year, she started experiencing loose stools. Sometimes it would be once to few times a day. No blood or nocturnal complaints. She was not having any additional complaints until recently. Recently, she started having some postprandial nausea as well as some left lower quad pain. Left lower quad pain was somewhat dull, lasted about 3 days, and resolved. She is no longer having abdominal pain. PCP did order an ultrasound that was concerning for a liver hemangioma. A CT scan has been ordered by her PCP, but she has not completed this yet. No additional complaints. Sb  9/2023: Autumn Escobar is a 43-year-old female who presents for progressive diarrhea. She was last seen in May with loose stools. Blood work was normal but a fecal Jesus Pro was borderline. A colonoscopy was recommended but Autumn elected to defer at that time. Over the last several months she has been experiencing worsening diarrhea, now experiencing 5 or more times per day. She saw her primary care provider who prescribed her budesonide 9 mg and cholestyramine. She reports that with these medications she is still experiencing cramping when she goes to the bathroom but her stools have been more of a mushy pudding consistency. She tried Imodium in the past with no improvement of symptoms. She is now having some 2 or 3 times a day. Sees occasional right red blood in her stool but thinks this is due to from irritation given the amount she is going to the bathroom. Her symptoms are significantly hindering her ability to function at work on a daily basis and she would like to proceed with a colonoscopy at this time. We discussed prescribing Lomotil for interval symptom relief. I will also prescribe dicyclomine for the cramps she is experiencing. Proceed with colonoscopy to rule out IBD/MC. LG.  11/14/2023 - colon - erosion in distal rectum normal but random biopsies of right colon with lymphocytic colitis  12/20/2023: Ms. Autumn escobar is a 43-year-old female who presents today for follow-up of colonoscopy. Since her last visit colonoscopy showed an erosion in the distal rectum with path consistent with lymphocytic colitis. It was otherwise normal. She is currently been taking Lomotil, Bentyl, and budesonide 9 mg all daily with pudding-like stools usually once a day. She also endorses occasional cramping and takes additional Bentyl. No blood in her stool. No nocturnal complaints. LG.   6/26/24: Ms. Escobar returns for follow-up of lymphocytic colitis. At her last clinic visit, she continued budesoide as well as Lomotil and dicyclomine. Today she reports that she has stopped her budesonide as well as Lomotil and dicyclomine. She is having pudding consistency stools after meals. Sometimes she will awaken from sleep around 3 AM to have a BM. Weight is stable. No blood noted.  8/14/24: Ms. Autumn Escobar is a 44-year-old female who presents today for follow-up of lymphocytic colitis and IBS-D. Since her last visit she resumed budesonide and was able to successfully weaned to 3 mg daily. When she stops budesonide she has breakthrough diarrhea. She is not requiring dicyclomine before meals and denies any abdominal pain. Denies any indigestion, nausea vomiting, melena, hematemesis, or upper abdominal pain and would like to defer EGD at this point. Understands indication to proceed with colonoscopy and will do so before the end of the year once deductible is met. LG.

## 2025-01-13 ENCOUNTER — TELEPHONE ENCOUNTER (OUTPATIENT)
Dept: URBAN - NONMETROPOLITAN AREA CLINIC 2 | Facility: CLINIC | Age: 45
End: 2025-01-13

## 2025-07-11 ENCOUNTER — OFFICE VISIT (OUTPATIENT)
Dept: URBAN - NONMETROPOLITAN AREA CLINIC 2 | Facility: CLINIC | Age: 45
End: 2025-07-11
Payer: COMMERCIAL

## 2025-07-11 DIAGNOSIS — K52.832 LYMPHOCYTIC COLITIS: ICD-10-CM

## 2025-07-11 PROCEDURE — 99214 OFFICE O/P EST MOD 30 MIN: CPT | Performed by: INTERNAL MEDICINE

## 2025-07-11 RX ORDER — DIPHENOXYLATE HYDROCHLORIDE AND ATROPINE SULFATE 2.5; .025 MG/1; MG/1
TABLET ORAL
Qty: 120 TABLET | Status: ACTIVE | COMMUNITY

## 2025-07-11 RX ORDER — VORTIOXETINE 10 MG/1
TABLET, FILM COATED ORAL
Qty: 30 | Status: ACTIVE | COMMUNITY

## 2025-07-11 RX ORDER — LAMOTRIGINE 50 MG/1
TABLET, ORALLY DISINTEGRATING ORAL
Qty: 90 | Status: ACTIVE | COMMUNITY

## 2025-07-11 RX ORDER — BUDESONIDE 3 MG/1
1 CAPSULE CAPSULE ORAL ONCE A DAY
Qty: 90 CAPSULE | Refills: 3 | Status: ACTIVE | COMMUNITY

## 2025-07-11 RX ORDER — OMEPRAZOLE 40 MG/1
CAPSULE, DELAYED RELEASE ORAL
Qty: 30 | Status: ACTIVE | COMMUNITY

## 2025-07-11 RX ORDER — BUDESONIDE 9 MG/1
1 TABLET IN THE MORNING TABLET, FILM COATED, EXTENDED RELEASE ORAL ONCE A DAY
Qty: 84 TABLET | Refills: 3 | Status: ACTIVE | COMMUNITY

## 2025-07-11 RX ORDER — UBROGEPANT 100 MG/1
1 TABLET MAY TAKE SECOND DOSE AT LEAST 2 HOURS AFTER FIRST DOSE AS NEEDED TABLET ORAL ONCE A DAY
Status: ACTIVE | COMMUNITY

## 2025-07-11 RX ORDER — DIPHENOXYLATE HYDROCHLORIDE AND ATROPINE SULFATE 2.5; .025 MG/1; MG/1
1 TABLET AS NEEDED TABLET ORAL
Qty: 120 TABLET | Refills: 0 | Status: ACTIVE | COMMUNITY

## 2025-07-11 RX ORDER — BUDESONIDE 3 MG/1
3 CAPSULES CAPSULE ORAL ONCE A DAY
Qty: 180 CAPSULE | Refills: 5 | OUTPATIENT
Start: 2025-07-11

## 2025-07-11 RX ORDER — GALCANEZUMAB 120 MG/ML
INJECTION, SOLUTION SUBCUTANEOUS
Qty: 3 MILLILITER | Status: ACTIVE | COMMUNITY

## 2025-07-11 RX ORDER — DICYCLOMINE HYDROCHLORIDE 20 MG/1
TAKE 1 TABLET BY MOUTH THREE TIMES DAILY TABLET ORAL
Qty: 90 EACH | Refills: 0 | Status: ACTIVE | COMMUNITY

## 2025-07-11 RX ORDER — FLUOXETINE 40 MG/1
CAPSULE ORAL
Qty: 90 | Status: ACTIVE | COMMUNITY

## 2025-07-11 RX ORDER — LIOTHYRONINE SODIUM 25 UG/1
TAKE 1 TABLET BY MOUTH EVERY DAY AS DIRECTED TABLET ORAL
Qty: 30 EACH | Refills: 1 | Status: ACTIVE | COMMUNITY

## 2025-07-11 RX ORDER — NORGESTIMATE AND ETHINYL ESTRADIOL 0.25-0.035
KIT ORAL
Qty: 112 | Status: ACTIVE | COMMUNITY

## 2025-07-11 NOTE — HPI-OTHER HISTORIES
5/10/22:  Ms. Escobar is a 42-year-old female who presents with change in bowel habit and abdominal pain. She states that earlier this year, she started experiencing loose stools. Sometimes it would be once to few times a day. No blood or nocturnal complaints. She was not having any additional complaints until recently. Recently, she started having some postprandial nausea as well as some left lower quad pain. Left lower quad pain was somewhat dull, lasted about 3 days, and resolved. She is no longer having abdominal pain. PCP did order an ultrasound that was concerning for a liver hemangioma. A CT scan has been ordered by her PCP, but she has not completed this yet. No additional complaints. Sb  9/2023: Autumn Escobar is a 43-year-old female who presents for progressive diarrhea. She was last seen in May with loose stools. Blood work was normal but a fecal Jesus Pro was borderline. A colonoscopy was recommended but Autumn elected to defer at that time. Over the last several months she has been experiencing worsening diarrhea, now experiencing 5 or more times per day. She saw her primary care provider who prescribed her budesonide 9 mg and cholestyramine. She reports that with these medications she is still experiencing cramping when she goes to the bathroom but her stools have been more of a mushy pudding consistency. She tried Imodium in the past with no improvement of symptoms. She is now having some 2 or 3 times a day. Sees occasional right red blood in her stool but thinks this is due to from irritation given the amount she is going to the bathroom. Her symptoms are significantly hindering her ability to function at work on a daily basis and she would like to proceed with a colonoscopy at this time. We discussed prescribing Lomotil for interval symptom relief. I will also prescribe dicyclomine for the cramps she is experiencing. Proceed with colonoscopy to rule out IBD/MC. LG.  11/14/2023 - colon - erosion in distal rectum normal but random biopsies of right colon with lymphocytic colitis  12/20/2023: Ms. Autumn escobar is a 43-year-old female who presents today for follow-up of colonoscopy. Since her last visit colonoscopy showed an erosion in the distal rectum with path consistent with lymphocytic colitis. It was otherwise normal. She is currently been taking Lomotil, Bentyl, and budesonide 9 mg all daily with pudding-like stools usually once a day. She also endorses occasional cramping and takes additional Bentyl. No blood in her stool. No nocturnal complaints. LG.   6/26/24: Ms. Escobar returns for follow-up of lymphocytic colitis. At her last clinic visit, she continued budesoide as well as Lomotil and dicyclomine. Today she reports that she has stopped her budesonide as well as Lomotil and dicyclomine. She is having pudding consistency stools after meals. Sometimes she will awaken from sleep around 3 AM to have a BM. Weight is stable. No blood noted.  8/14/24: Ms. Autumn Escobar is a 44-year-old female who presents today for follow-up of lymphocytic colitis and IBS-D. Since her last visit she resumed budesonide and was able to successfully weaned to 3 mg daily. When she stops budesonide she has breakthrough diarrhea. She is not requiring dicyclomine before meals and denies any abdominal pain. Denies any indigestion, nausea vomiting, melena, hematemesis, or upper abdominal pain and would like to defer EGD at this point. Understands indication to proceed with colonoscopy and will do so before the end of the year once deductible is met. LG.  1/13/25: Ms. Autumn Escobar is a 44-year-old female who presents today via telephone call for follow-up of lymphocytic colitis.  Telephone was used in lieu of telehealth due to technical difficulties.  She remains on budesonide 3 mg daily.  When she stops the medication she develops chronic diarrhea.  She is very happy on budesonide right now.  She is not having side effects and no difficulties obtaining the medication.  She had a repeat colonoscopy scheduled it but had to cancel.  1/25: Ms. Autumn Escobar is a 44-year-old female who presents today via telephone call for follow-up of lymphocytic colitis.  Telephone was used in lieu of telehealth due to technical difficulties.  She remains on budesonide 3 mg daily.  When she stops the medication she develops chronic diarrhea.  She is very happy on budesonide right now.  She is not having side effects and no difficulties obtaining the medication.  She had a repeat colonoscopy scheduled it but had to cancel.  7/11/25: Ms. Escobar returns to GI clinic for follow-up of lymphocytic colitis.  She has been doing well but requires one 3 mg budesonide daily to remain in remission.  When she goes to QOD or stops she gets abdominal cramping and bad diarrhea.

## 2025-07-14 ENCOUNTER — WEB ENCOUNTER (OUTPATIENT)
Dept: URBAN - NONMETROPOLITAN AREA CLINIC 2 | Facility: CLINIC | Age: 45
End: 2025-07-14

## 2025-07-14 RX ORDER — BUDESONIDE 3 MG/1
3 CAPSULES CAPSULE ORAL ONCE A DAY
Qty: 180 CAPSULE | Refills: 5
Start: 2025-07-11

## 2025-07-15 ENCOUNTER — WEB ENCOUNTER (OUTPATIENT)
Dept: URBAN - NONMETROPOLITAN AREA CLINIC 2 | Facility: CLINIC | Age: 45
End: 2025-07-15